# Patient Record
Sex: FEMALE | Race: OTHER | HISPANIC OR LATINO | URBAN - METROPOLITAN AREA
[De-identification: names, ages, dates, MRNs, and addresses within clinical notes are randomized per-mention and may not be internally consistent; named-entity substitution may affect disease eponyms.]

---

## 2018-08-12 ENCOUNTER — EMERGENCY (EMERGENCY)
Facility: HOSPITAL | Age: 38
LOS: 1 days | Discharge: HOME | End: 2018-08-12
Admitting: EMERGENCY MEDICINE
Payer: MEDICAID

## 2018-08-12 VITALS
RESPIRATION RATE: 16 BRPM | TEMPERATURE: 98 F | HEART RATE: 69 BPM | SYSTOLIC BLOOD PRESSURE: 122 MMHG | DIASTOLIC BLOOD PRESSURE: 75 MMHG | OXYGEN SATURATION: 100 %

## 2018-08-12 VITALS
RESPIRATION RATE: 18 BRPM | OXYGEN SATURATION: 98 % | DIASTOLIC BLOOD PRESSURE: 85 MMHG | TEMPERATURE: 98 F | HEART RATE: 63 BPM | SYSTOLIC BLOOD PRESSURE: 149 MMHG

## 2018-08-12 DIAGNOSIS — R39.15 URGENCY OF URINATION: ICD-10-CM

## 2018-08-12 DIAGNOSIS — A08.4 VIRAL INTESTINAL INFECTION, UNSPECIFIED: ICD-10-CM

## 2018-08-12 DIAGNOSIS — R42 DIZZINESS AND GIDDINESS: ICD-10-CM

## 2018-08-12 DIAGNOSIS — K59.00 CONSTIPATION, UNSPECIFIED: ICD-10-CM

## 2018-08-12 DIAGNOSIS — R55 SYNCOPE AND COLLAPSE: ICD-10-CM

## 2018-08-12 DIAGNOSIS — R11.10 VOMITING, UNSPECIFIED: ICD-10-CM

## 2018-08-12 LAB
ALBUMIN SERPL ELPH-MCNC: 3.8 G/DL — SIGNIFICANT CHANGE UP (ref 3.4–5)
ALP SERPL-CCNC: 73 U/L — SIGNIFICANT CHANGE UP (ref 40–120)
ALT FLD-CCNC: 32 U/L — SIGNIFICANT CHANGE UP (ref 12–42)
ANION GAP SERPL CALC-SCNC: 6 MMOL/L — LOW (ref 9–16)
APPEARANCE UR: CLEAR — SIGNIFICANT CHANGE UP
AST SERPL-CCNC: 42 U/L — HIGH (ref 15–37)
BASOPHILS NFR BLD AUTO: 0.3 % — SIGNIFICANT CHANGE UP (ref 0–2)
BILIRUB SERPL-MCNC: 0.3 MG/DL — SIGNIFICANT CHANGE UP (ref 0.2–1.2)
BILIRUB UR-MCNC: NEGATIVE — SIGNIFICANT CHANGE UP
BUN SERPL-MCNC: 6 MG/DL — LOW (ref 7–23)
CALCIUM SERPL-MCNC: 9.1 MG/DL — SIGNIFICANT CHANGE UP (ref 8.5–10.5)
CHLORIDE SERPL-SCNC: 104 MMOL/L — SIGNIFICANT CHANGE UP (ref 96–108)
CO2 SERPL-SCNC: 26 MMOL/L — SIGNIFICANT CHANGE UP (ref 22–31)
COLOR SPEC: YELLOW — SIGNIFICANT CHANGE UP
CREAT SERPL-MCNC: 0.44 MG/DL — LOW (ref 0.5–1.3)
DIFF PNL FLD: NEGATIVE — SIGNIFICANT CHANGE UP
EOSINOPHIL NFR BLD AUTO: 0.9 % — SIGNIFICANT CHANGE UP (ref 0–6)
GLUCOSE SERPL-MCNC: 83 MG/DL — SIGNIFICANT CHANGE UP (ref 70–99)
GLUCOSE UR QL: NEGATIVE — SIGNIFICANT CHANGE UP
HCG UR QL: NEGATIVE — SIGNIFICANT CHANGE UP
HCT VFR BLD CALC: 38.2 % — SIGNIFICANT CHANGE UP (ref 34.5–45)
HGB BLD-MCNC: 12.2 G/DL — SIGNIFICANT CHANGE UP (ref 11.5–15.5)
IMM GRANULOCYTES NFR BLD AUTO: 0.3 % — SIGNIFICANT CHANGE UP (ref 0–1.5)
KETONES UR-MCNC: NEGATIVE — SIGNIFICANT CHANGE UP
LEUKOCYTE ESTERASE UR-ACNC: ABNORMAL
LIDOCAIN IGE QN: 174 U/L — SIGNIFICANT CHANGE UP (ref 73–393)
LYMPHOCYTES # BLD AUTO: 27.9 % — SIGNIFICANT CHANGE UP (ref 13–44)
MCHC RBC-ENTMCNC: 26.6 PG — LOW (ref 27–34)
MCHC RBC-ENTMCNC: 31.9 G/DL — LOW (ref 32–36)
MCV RBC AUTO: 83.4 FL — SIGNIFICANT CHANGE UP (ref 80–100)
MONOCYTES NFR BLD AUTO: 5.2 % — SIGNIFICANT CHANGE UP (ref 2–14)
NEUTROPHILS NFR BLD AUTO: 65.4 % — SIGNIFICANT CHANGE UP (ref 43–77)
NITRITE UR-MCNC: NEGATIVE — SIGNIFICANT CHANGE UP
PH UR: 7.5 — SIGNIFICANT CHANGE UP (ref 5–8)
PLATELET # BLD AUTO: 164 K/UL — SIGNIFICANT CHANGE UP (ref 150–400)
POTASSIUM SERPL-MCNC: 4.7 MMOL/L — SIGNIFICANT CHANGE UP (ref 3.5–5.3)
POTASSIUM SERPL-SCNC: 4.7 MMOL/L — SIGNIFICANT CHANGE UP (ref 3.5–5.3)
PROT SERPL-MCNC: 8 G/DL — SIGNIFICANT CHANGE UP (ref 6.4–8.2)
PROT UR-MCNC: NEGATIVE MG/DL — SIGNIFICANT CHANGE UP
RBC # BLD: 4.58 M/UL — SIGNIFICANT CHANGE UP (ref 3.8–5.2)
RBC # FLD: 15.5 % — SIGNIFICANT CHANGE UP (ref 10.3–16.9)
SODIUM SERPL-SCNC: 136 MMOL/L — SIGNIFICANT CHANGE UP (ref 132–145)
SP GR SPEC: 1.01 — SIGNIFICANT CHANGE UP (ref 1–1.03)
TSH SERPL-MCNC: 0.18 UIU/ML — LOW (ref 0.36–3.74)
UROBILINOGEN FLD QL: 0.2 E.U./DL — SIGNIFICANT CHANGE UP
WBC # BLD: 11.3 K/UL — HIGH (ref 3.8–10.5)
WBC # FLD AUTO: 11.3 K/UL — HIGH (ref 3.8–10.5)

## 2018-08-12 PROCEDURE — 74177 CT ABD & PELVIS W/CONTRAST: CPT | Mod: 26

## 2018-08-12 PROCEDURE — 93010 ELECTROCARDIOGRAM REPORT: CPT

## 2018-08-12 PROCEDURE — 99284 EMERGENCY DEPT VISIT MOD MDM: CPT | Mod: 25

## 2018-08-12 PROCEDURE — 76830 TRANSVAGINAL US NON-OB: CPT | Mod: 26

## 2018-08-12 RX ORDER — KETOROLAC TROMETHAMINE 30 MG/ML
30 SYRINGE (ML) INJECTION ONCE
Qty: 0 | Refills: 0 | Status: COMPLETED | OUTPATIENT
Start: 2018-08-12 | End: 2018-08-12

## 2018-08-12 RX ORDER — SODIUM CHLORIDE 9 MG/ML
1000 INJECTION INTRAMUSCULAR; INTRAVENOUS; SUBCUTANEOUS ONCE
Qty: 0 | Refills: 0 | Status: COMPLETED | OUTPATIENT
Start: 2018-08-12 | End: 2018-08-12

## 2018-08-12 RX ORDER — ONDANSETRON 8 MG/1
4 TABLET, FILM COATED ORAL ONCE
Qty: 0 | Refills: 0 | Status: COMPLETED | OUTPATIENT
Start: 2018-08-12 | End: 2018-08-12

## 2018-08-12 RX ORDER — IOHEXOL 300 MG/ML
30 INJECTION, SOLUTION INTRAVENOUS ONCE
Qty: 0 | Refills: 0 | Status: COMPLETED | OUTPATIENT
Start: 2018-08-12 | End: 2018-08-12

## 2018-08-12 RX ADMIN — ONDANSETRON 4 MILLIGRAM(S): 8 TABLET, FILM COATED ORAL at 16:56

## 2018-08-12 RX ADMIN — SODIUM CHLORIDE 1000 MILLILITER(S): 9 INJECTION INTRAMUSCULAR; INTRAVENOUS; SUBCUTANEOUS at 16:56

## 2018-08-12 RX ADMIN — IOHEXOL 30 MILLILITER(S): 300 INJECTION, SOLUTION INTRAVENOUS at 16:56

## 2018-08-12 NOTE — ED PROVIDER NOTE - NS ED ROS FT
Denies fevers, diarrhea, constipation, vaginal bleeding, abnormal vaginal discharge, chest pain, palpitations, shortness of breath, dyspnea on exertion, cough/URI symptoms, headache, weakness, numbness, focal deficits, visual changes, gait or balance changes.

## 2018-08-12 NOTE — ED PROVIDER NOTE - PROGRESS NOTE DETAILS
CT, US, and labs unremarkable. Will DC with strict return precautions. Pt tolerating PO. Pelvic exam unremarkable. Abd soft, non-tender. denies current pain or nausea. ate meal

## 2018-08-12 NOTE — ED ADULT NURSE NOTE - NSIMPLEMENTINTERV_GEN_ALL_ED
Implemented All Universal Safety Interventions:  [place] to call system. Call bell, personal items and telephone within reach. Instruct patient to call for assistance. Room bathroom lighting operational. Non-slip footwear when patient is off stretcher. Physically safe environment: no spills, clutter or unnecessary equipment. Stretcher in lowest position, wheels locked, appropriate side rails in place.

## 2018-08-12 NOTE — ED PROVIDER NOTE - CHPI ED SYMPTOMS POS
SYNCOPE/constipation, urinary urgency/DIZZINESS SYNCOPE/constipation, urinary urgency, light headedness

## 2018-08-12 NOTE — ED PROVIDER NOTE - OBJECTIVE STATEMENT
37 y/o with a PMHx of hyperthyroidism (not on meds) presents to the ED s/p syncopal episode while at work. Pt states that she had sx of lightheadedness, nausea, constipation, chills, urinary urgency and lower abdominal pain for 2 weeks, and vomiting since yesterday. She reports that sx have been worse today. Pt further states that she has hyperthyroidism but has not f/u with a PMD, however she has had blood work done at [facility] previously. She has also had vaginal LEEPs performed annually due to high risk pap smear results. LNMP: June 2018 Pt denies head trauma, vaginal bleeding, abnormal vaginal discharge, chest pain, SOB, and headaches. 37 y/o with a PMHx of hyperthyroidism (not on meds) presents to the ED s/p syncopal episode while at work. Pt states that she had sx of lightheadedness, nausea, constipation, chills, urinary urgency and lower abdominal pain for 2 weeks, and vomiting since yesterday. She reports that sx have been worse today. Pt further states that she has hyperthyroidism but has not f/u with a PMD, however she has had blood work done at [facility] previously. She has also had vaginal LEEPs performed annually due to high risk pap smear results. LNMP: June 2018. Pt denies head trauma, vaginal bleeding, abnormal vaginal discharge, chest pain, SOB, and headaches.

## 2018-08-12 NOTE — ED PROVIDER NOTE - PHYSICAL EXAMINATION
VITAL SIGNS: I have reviewed nursing notes and confirm.  CONSTITUTIONAL: Well-developed; well-nourished; in no acute distress.  SKIN: Skin is warm and dry, no acute rash.  HEAD: Normocephalic; atraumatic.  EYES: PERRL, EOM intact; conjunctiva and sclera clear.  ENT: No nasal discharge; airway clear.  NECK: Supple; non tender.  CARD: S1, S2 normal; no murmurs, gallops, or rubs. Regular rate and rhythm.  RESP: No wheezes, rales or rhonchi.  ABD: B/L lower quadrant with guarding R > L. Suprapubic tenderness with guarding; no hepatosplenomegaly.  EXT: Normal ROM. No clubbing, cyanosis or edema.  NEURO: Alert, oriented. Grossly unremarkable.  PSYCH: Cooperative, appropriate.

## 2018-08-14 LAB
CULTURE RESULTS: NO GROWTH — SIGNIFICANT CHANGE UP
SPECIMEN SOURCE: SIGNIFICANT CHANGE UP

## 2019-12-03 NOTE — ED PROVIDER NOTE - NS ED SCRIBE ACP NAME FT
CHIEF COMPLAINT:  Chief Complaint   Patient presents with   • Pre-Op Exam     Pre-op. Surgery with Dr. De Luna on 12/23/19        HISTORY OF PRESENT ILLNESS:    The patient is a 65 year old female in today for a preoperative evaluation and anesthesia clearance at the request of Dr. De Luna.  The patient is scheduled for right knee arthroscopy with partial medial meniscectomy, partial lateral meniscectomy, and chondroplasty on 12/23/2019 at [facility]. She injured her right knee in July and has been using a cane to walk due to the persistent pain. She has had MRI of the right knee on 10/24/2019 which showed:   IMPRESSION:     1. Complex tears involving the posterior horn medial meniscus with full  width radial tear extending to the central attachment.  2. Horizontal/oblique tears of the posterior body/posterior horn lateral  meniscus.  3. Partial tear of the popliteus myotendinous junction.  4. 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     She has discussed treatment options with Dr. De Luna and has elected to proceed as noted above.    The patient states they do not have a history of any anesthesia related complications.    Functional capacity - Patient is able to climb two fight of stairs .      Patient denies any history of ischemic heart disease, current complaint of chest pain, history of heart failure, history of cerebrovascular disease, diabetes requiring insulin, or kidney disease with elevated serum creatinine. No family history of any bleeding or clotting disorders.    ALLERGIES:   Allergen Reactions   • Penicillins RASH and Other (See Comments)     Unknown reaction   • Sertraline VISUAL DISTURBANCE       Outpatient Medications Marked as Taking for the 12/3/19 encounter (Office Visit) with Vivien Headley PA-C   Medication Sig Dispense Refill   • citalopram (CELEXA) 20 MG tablet Take 1 tablet by mouth daily. 90 tablet 1   • traZODone (DESYREL)  100 MG tablet Take 1 tablet by mouth nightly. 90 tablet 0   • Multiple Vitamins-Minerals (MULTIVITAMIN ADULT PO) Take 1 capsule by mouth daily.     • calcium carbonate-vitamin D (CALTRATE+D) 600-400 MG-UNIT per tablet Take 1 tablet by mouth daily.         Past Surgical History:   Procedure Laterality Date   • Carpal tunnel release Right 10/01/2015   • Colonoscopy diagnostic  07/12/2010    small polyp, diverticulosis   • Removal of tonsils,12+ y/o  01/01/2002   • Skin cancer excision  01/01/2004    low-grade squamous intraepithelial lesion   • Tubal ligation  02/01/1999       Patient Active Problem List   Diagnosis   • Myopia with presbyopia of both eyes   • Anxiety   • Prediabetes   • Diverticulosis of large intestine   • Gluten free diet   • Bilateral sensorineural hearing loss       Social History     Socioeconomic History   • Marital status: /Civil Union     Spouse name: Not on file   • Number of children: Not on file   • Years of education: Not on file   • Highest education level: Not on file   Occupational History   • Not on file   Social Needs   • Financial resource strain: Not on file   • Food insecurity:     Worry: Not on file     Inability: Not on file   • Transportation needs:     Medical: Not on file     Non-medical: Not on file   Tobacco Use   • Smoking status: Never Smoker   • Smokeless tobacco: Never Used   Substance and Sexual Activity   • Alcohol use: No     Alcohol/week: 0.0 standard drinks   • Drug use: No   • Sexual activity: Not on file   Lifestyle   • Physical activity:     Days per week: 2 days     Minutes per session: 30 min   • Stress: Not on file   Relationships   • Social connections:     Talks on phone: Not on file     Gets together: Not on file     Attends Worship service: Not on file     Active member of club or organization: Not on file     Attends meetings of clubs or organizations: Not on file     Relationship status: Not on file   • Intimate partner violence:     Fear of  current or ex partner: Not on file     Emotionally abused: Not on file     Physically abused: Not on file     Forced sexual activity: Not on file   Other Topics Concern   • Not on file   Social History Narrative   • Not on file       Family History   Problem Relation Age of Onset   • Cataracts Father    • Hypertension Father    • Heart disease Father    • Macular degeneration Mother    • Cataracts Mother    • Osteoporosis Mother    • Hyperlipidemia Mother    • Heart Mother    • Macular degeneration Maternal Aunt    • Blindness Maternal Aunt    • Diabetes Maternal Grandmother    • Cancer Other         throat       Immunization History   Administered Date(s) Administered   • Influenza, injectable, quadrivalent, preservative-free 10/29/2017, 10/29/2018   • Influenza, seasonal, injectable, preservative free 10/29/2017   • TD Adult, Adsorbed 07/23/2001   • Td:Adult type tetanus/diphtheria 07/23/2001   • Tdap 02/19/2013   • Zoster Shingles 03/18/2015       REVIEW OF SYSTEMS:  CONSTITUTIONAL: Patient denies fever, chills, malaise, unintentional weight loss or gain.  EYES: Denies any change in visual acuity.  HEENT: Denies sinus drainage/pain, ear pain, nasal congestion, nose bleeds, bleeding gums, or sore throat.  RESPIRATORY: Denies cough, dyspnea, or known pulmonary disease.   CARDIOVASCULAR: Denies chest pain, palpatations, dizziness, or decreased exercise tolerance over the last 6 months.  Denies calf pain or leg swelling.  GI: Denies abdominal pain, nausea, vomiting, diarrhea.  Denies black, bloody or tarry stools. Positive for heartburn with NSAID use (which she has been avoiding now).   : Denies painful urination, urinary frequency, blood in urine.  NEUROLOGIC: Denies headache, facial numbness, tingling or focal weakness.  MUSCULOSKELETAL: Denies back pain or neck pain.  LYMPHATICS: Denies painful or swollen glands.  HEME: Denies easy bruising/bleeding, history of thromboembolism, or any bleeding/clotting  disorder.    OBJECTIVE:  VITAL SIGNS:    Visit Vitals  /76   Pulse 64   Ht 5' 5\" (1.651 m)   Wt 76.4 kg   BMI 28.04 kg/m²     GENERAL:  The patient is well-developed and well nourished.  She is in no acute distress and appears comfortable at rest.  PSYCHIATRIC:  She is alert and oriented x3. Her recent and remote memory, mood and affect are appropriate.  HEENT:  Normocephalic, atraumatic. Pupils are equal, round and react to light and accommodation.. Extraocular movements are intact. Scleral and conjunctival surfaces are without injection. Tympanic membranes, ear canals and lobes are normal.  In. Throat is without erythema or exudate. Dentition is in adequate condition.  NECK:  Supple without tenderness, adenopathy or increase in thyroid.  LUNGS:   Lungs are clear to auscultation without respiratory distress.  HEART:  Heart has a regular rate and rhythm without murmur.  There is no pedal edema.  ABDOMEN:   Soft, flat, nontender, without hepatosplenomegaly, mass, rebound or guarding.  Bowel sounds are normal.   MUSCULOSKELETAL:  There is no axial deformity. There is no discomfort with fist percussion of the costovertebral angle or spine. There is no deformity of the upper or lower extremities. . Strength is appropriate.  SKIN:  Skin is warm and dry. There is no rash or suspicious lesion.  NEUROLOGIC: Cranial nerves II through XII are grossly intact. Deep tendon reflexes are symmetric throughout.    Lab Services on 12/03/2019   Component Date Value   • WBC 12/03/2019 4.6    • RBC 12/03/2019 4.26    • HGB 12/03/2019 12.9    • HCT 12/03/2019 39.6    • MCV 12/03/2019 93.0    • MCH 12/03/2019 30.3    • MCHC 12/03/2019 32.6    • RDW-CV 12/03/2019 12.3    • PLT 12/03/2019 240    • NRBC 12/03/2019 0    • DIFF TYPE 12/03/2019 AUTOMATED DIFFERENTIAL    • Neutrophil 12/03/2019 58    • LYMPH 12/03/2019 29    • MONO 12/03/2019 8    • EOSIN 12/03/2019 4    • BASO 12/03/2019 1    • Percent Immature Granulo* 12/03/2019 0    •  Absolute Neutrophil 12/03/2019 2.7    • Absolute Lymph 12/03/2019 1.3    • Absolute Mono 12/03/2019 0.4    • Absolute Eos 12/03/2019 0.2    • Absolute Baso 12/03/2019 0.1    • Absolute Immature Granul* 12/03/2019 0.0    • Fasting Status 12/03/2019 12    • Sodium 12/03/2019 140    • Potassium 12/03/2019 5.2*   • Chloride 12/03/2019 107    • Carbon Dioxide 12/03/2019 30    • Anion Gap 12/03/2019 8*   • Glucose 12/03/2019 106*   • BUN 12/03/2019 20    • Creatinine 12/03/2019 0.84    • GFR Estimate,  Am* 12/03/2019 85    • GFR Estimate, Non Sherice* 12/03/2019 73    • BUN/Creatinine Ratio 12/03/2019 24    • CALCIUM 12/03/2019 9.7    Office Visit on 12/03/2019   Component Date Value   • Ventricular Rate EKG/Min* 12/03/2019 54    • Atrial Rate (BPM) 12/03/2019 54    • OH-Interval (MSEC) 12/03/2019 156    • QRS-Interval (MSEC) 12/03/2019 80    • QT-Interval (MSEC) 12/03/2019 444    • QTc 12/03/2019 421    • P Axis (Degrees) 12/03/2019 56    • R Axis (Degrees) 12/03/2019 25    • T Axis (Degrees) 12/03/2019 13    • REPORT TEXT 12/03/2019                      Value:Sinus bradycardia  Otherwise normal ECG  No previous ECGs available             ASSESSMENT/PLAN:     Preop examination  Acute medial meniscus tear of right knee, subsequent encounter    Need for vaccination  -     INFLUENZA ENHANCED HIGH DOSE SPLIT PRES FREE VACC, IM (FLUZONE-HIGH DOSE)      1. Options, risks and benefits, possible complications, and expectations discussed by Dr. De Luna and the patient has elected to proceed with the proposed procedure.   2. The patient is advised not to take any supplements,  aspirin, nonsteroidal, or anti-inflammatory medications including Advil, Motrin, Aleve, ibuprofen, etc. one week prior to the the procedure.  Her other daily medications may be taken as usual up until the night before surgery. All medications reviewed, and will hold all medications on the morning of surgery.   3. Chronic medical conditions well  controlled.   4. Based on the patient's clinical history She falls into a low risk category (RCRI Class I) from a cardiovascular standpoint, is scheduled to undergo an intermediate risk procedure, and has normal exercise capacity. At this point in time I do not see any absolute contraindication to proceeding with surgery as planned.      Vivien Headley PA-C    [facility]  [address] [place] [address]  Phone [phone]  Fax [phone]    Supervising physician is Alin Osuna MD    CC to Dr. De Luna     PATRICIA Renteria
